# Patient Record
Sex: FEMALE | Race: BLACK OR AFRICAN AMERICAN | NOT HISPANIC OR LATINO | URBAN - METROPOLITAN AREA
[De-identification: names, ages, dates, MRNs, and addresses within clinical notes are randomized per-mention and may not be internally consistent; named-entity substitution may affect disease eponyms.]

---

## 2024-11-17 ENCOUNTER — OFFICE VISIT (OUTPATIENT)
Dept: URGENT CARE | Age: 44
End: 2024-11-17
Payer: MEDICARE

## 2024-11-17 VITALS
DIASTOLIC BLOOD PRESSURE: 78 MMHG | HEIGHT: 68 IN | RESPIRATION RATE: 20 BRPM | TEMPERATURE: 98.2 F | WEIGHT: 165 LBS | BODY MASS INDEX: 25.01 KG/M2 | OXYGEN SATURATION: 98 % | SYSTOLIC BLOOD PRESSURE: 111 MMHG | HEART RATE: 70 BPM

## 2024-11-17 DIAGNOSIS — H57.11 PAIN OF RIGHT EYE: Primary | ICD-10-CM

## 2024-11-17 DIAGNOSIS — H57.9 INJECTED EYE, RIGHT: ICD-10-CM

## 2024-11-17 PROCEDURE — 3008F BODY MASS INDEX DOCD: CPT | Performed by: STUDENT IN AN ORGANIZED HEALTH CARE EDUCATION/TRAINING PROGRAM

## 2024-11-17 PROCEDURE — 99205 OFFICE O/P NEW HI 60 MIN: CPT | Performed by: STUDENT IN AN ORGANIZED HEALTH CARE EDUCATION/TRAINING PROGRAM

## 2024-11-17 ASSESSMENT — ENCOUNTER SYMPTOMS
EYE ITCHING: 0
PHOTOPHOBIA: 1
EYE DISCHARGE: 1
EYE PAIN: 1
EYE REDNESS: 1

## 2024-11-17 NOTE — PATIENT INSTRUCTIONS
Please proceed to emergency department for further evaluation    For ER: evaluate IOP/rule out acute angle closure glaucoma

## 2024-11-18 NOTE — PROGRESS NOTES
"Subjective   Patient ID: Vanesa Dhaliwal is a 44 y.o. female. They present today with a chief complaint of Eye Problem (Rt eye red and tearing.  Pt noticed redness last night.  Pt did flush eye out with water and also took zertac/).    History of Present Illness  Pt presents with R eye pain and redness. Started yesterday. +copious clear tearing. States she is feeling a sharp pain underneath the upper eyelid. She is a contact wearer. No fb introduction. Tried flushing with eye rinse from a first aid kit. She has slight photophobia. Does not feel vision is changed, not currently wearing her contacts. No hx of similar. Denies itching, headache, dizziness, nv, uri sx, swelling of eye.       History provided by:  Patient  Eye Problem  Associated symptoms: discharge, photophobia and redness    Associated symptoms: no itching        Past Medical History  Allergies as of 11/17/2024    (No Known Allergies)       (Not in a hospital admission)       History reviewed. No pertinent past medical history.    History reviewed. No pertinent surgical history.         Review of Systems  Review of Systems   Eyes:  Positive for photophobia, pain, discharge and redness. Negative for itching and visual disturbance.   All other systems reviewed and are negative.                                 Objective    Vitals:    11/17/24 1246   BP: 111/78   Pulse: 70   Resp: 20   Temp: 36.8 °C (98.2 °F)   SpO2: 98%   Weight: 74.8 kg (165 lb)   Height: 1.727 m (5' 8\")     No LMP recorded.    Physical Exam  Vitals and nursing note reviewed.   Constitutional:       General: She is not in acute distress.     Appearance: Normal appearance. She is not ill-appearing, toxic-appearing or diaphoretic.   Eyes:      General: Lids are normal. Lids are everted, no foreign bodies appreciated. Gaze aligned appropriately.         Right eye: Discharge (clear, copious) present. No foreign body or hordeolum.         Left eye: No foreign body, discharge or hordeolum.      " Extraocular Movements: Extraocular movements intact.      Right eye: Normal extraocular motion and no nystagmus.      Left eye: Normal extraocular motion and no nystagmus.      Conjunctiva/sclera:      Right eye: Right conjunctiva is injected. No chemosis, exudate or hemorrhage.     Left eye: Left conjunctiva is not injected. No chemosis, exudate or hemorrhage.     Pupils:      Right eye: No corneal abrasion or fluorescein uptake. Pratibha exam negative.      Comments: R eye: 20/40  L eye: 20/40  Both eyes: 20/40  Uncorrected     Vision is grossly intact. No FB. No corneal haziness. No pain or visual changes with EOM. EOM intact. PERRLA. No periorbital edema erythema tenderness or crepitus. No photophobia. Fluorescein staining negative for corneal abrasion or ulceration, negative Pratibha sign.    Neurological:      Mental Status: She is alert.         Procedures    Point of Care Test & Imaging Results from this visit  No results found for this visit on 11/17/24.   No results found.    Diagnostic study results (if any) were reviewed by Jihan Carvalho PA-C.    Assessment/Plan   Allergies, medications, history, and pertinent labs/EKGs/Imaging reviewed by Jihan Carvalho PA-C.     Medical Decision Making  Patient was referred to ED at this time for further evaluation, needs IOP evaluated to rule out angle closure glaucoma. Given that patient does not feel her vision is affected, this is lower on the differential however I did not see any evidence of corneal abrasion or ulceration on exam. Given that it is Sunday, I was not able to have patient see optho urgently on outpatient basis which is why she was referred to ED  Ddx: allergic conjunctivitis, scleritis, episcleritis, acute angle closure glaucoma, corneal irritation from contact lens over wear  Lower concern for pre or post septal cellulitis  Pt is agreeable to plan, will be going to [place] ED from clinic via [facility]. Stable for self transport. Advised of risks of  not seeking further evaluation up to and including vision loss. Pt verbalizes understanding of risks.      Orders and Diagnoses  Diagnoses and all orders for this visit:  Pain of right eye  Injected eye, right      Medical Admin Record      Patient disposition: ED    Electronically signed by Jihan Carvalho PA-C  7:14 PM